# Patient Record
Sex: MALE
[De-identification: names, ages, dates, MRNs, and addresses within clinical notes are randomized per-mention and may not be internally consistent; named-entity substitution may affect disease eponyms.]

---

## 2020-10-21 PROBLEM — Z00.00 ENCOUNTER FOR PREVENTIVE HEALTH EXAMINATION: Status: ACTIVE | Noted: 2020-10-21

## 2020-10-27 ENCOUNTER — APPOINTMENT (OUTPATIENT)
Dept: NEUROSURGERY | Facility: CLINIC | Age: 20
End: 2020-10-27
Payer: COMMERCIAL

## 2020-10-27 VITALS
HEART RATE: 97 BPM | WEIGHT: 210 LBS | HEIGHT: 70 IN | TEMPERATURE: 98.2 F | OXYGEN SATURATION: 98 % | SYSTOLIC BLOOD PRESSURE: 124 MMHG | BODY MASS INDEX: 30.06 KG/M2 | DIASTOLIC BLOOD PRESSURE: 82 MMHG

## 2020-10-27 DIAGNOSIS — M54.5 LOW BACK PAIN: ICD-10-CM

## 2020-10-27 DIAGNOSIS — M54.2 CERVICALGIA: ICD-10-CM

## 2020-10-27 DIAGNOSIS — M54.9 DORSALGIA, UNSPECIFIED: ICD-10-CM

## 2020-10-27 PROCEDURE — 99203 OFFICE O/P NEW LOW 30 MIN: CPT

## 2020-10-27 NOTE — PHYSICAL EXAM
[General Appearance - Alert] : alert [General Appearance - In No Acute Distress] : in no acute distress [General Appearance - Well Nourished] : well nourished [General Appearance - Well Developed] : well developed [General Appearance - Well-Appearing] : healthy appearing [] : normal voice and communication

## 2020-10-30 PROBLEM — M54.2 PAIN IN NECK: Status: ACTIVE | Noted: 2020-10-30

## 2020-10-30 PROBLEM — M54.9 PAIN IN BACK: Status: ACTIVE | Noted: 2020-10-30

## 2020-10-30 NOTE — ASSESSMENT
[FreeTextEntry1] : He has severe pain. Since PT has not helped. I will have him do pain management and flexion extension films.

## 2020-10-30 NOTE — HISTORY OF PRESENT ILLNESS
[FreeTextEntry1] : neck and low back pain  [de-identified] : 19 y/o male with hx of neck and lumbar spine\par neck pain less than year. 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 \par Ibuprofen which has helped. Was receommended for injection but was told to hold off until he is evelauted by neurosurgery\par Denies weakness and incontience.

## 2020-10-30 NOTE — REVIEW OF SYSTEMS
[Numbness] : numbness [Tingling] : tingling [Abnormal Sensation] : an abnormal sensation [Negative] : Heme/Lymph [Arm Weakness] : no arm weakness [Hand Weakness] : no hand weakness [Difficulty Walking] : no difficulty walking [Inability to Walk] : able to walk [Ataxia] : no ataxia [Frequent Falls] : not falling [Limping] : not limping [FreeTextEntry9] : neck and lower back pain

## 2021-02-04 ENCOUNTER — APPOINTMENT (OUTPATIENT)
Dept: NEUROSURGERY | Facility: CLINIC | Age: 21
End: 2021-02-04